# Patient Record
Sex: MALE | Race: WHITE | Employment: FULL TIME | URBAN - METROPOLITAN AREA
[De-identification: names, ages, dates, MRNs, and addresses within clinical notes are randomized per-mention and may not be internally consistent; named-entity substitution may affect disease eponyms.]

---

## 2022-08-09 ENCOUNTER — HOSPITAL ENCOUNTER (OUTPATIENT)
Age: 37
Discharge: HOME | End: 2022-08-09
Payer: COMMERCIAL

## 2022-08-09 VITALS
HEART RATE: 78 BPM | BODY MASS INDEX: 27.4 KG/M2 | RESPIRATION RATE: 18 BRPM | DIASTOLIC BLOOD PRESSURE: 95 MMHG | TEMPERATURE: 99 F | OXYGEN SATURATION: 100 % | WEIGHT: 225 LBS | HEIGHT: 76 IN | SYSTOLIC BLOOD PRESSURE: 144 MMHG

## 2022-08-09 DIAGNOSIS — U07.1 COVID-19: Primary | ICD-10-CM

## 2022-08-09 LAB — SARS-COV-2 RNA RESP QL NAA+PROBE: DETECTED

## 2022-08-09 PROCEDURE — 99203 OFFICE O/P NEW LOW 30 MIN: CPT | Performed by: NURSE PRACTITIONER

## 2022-08-09 PROCEDURE — U0002 COVID-19 LAB TEST NON-CDC: HCPCS | Performed by: NURSE PRACTITIONER

## 2022-08-09 NOTE — ED INITIAL ASSESSMENT (HPI)
Presents with complaints of cough, runny nose, sore throat since approx. Sunday night. Denies  home covid test. Denies feeling feverish. Reports being exposed to covid positive family member and family member with pneumonia.

## 2023-07-04 ENCOUNTER — HOSPITAL ENCOUNTER (OUTPATIENT)
Age: 38
Discharge: HOME | End: 2023-07-04
Payer: COMMERCIAL

## 2023-07-04 VITALS
RESPIRATION RATE: 16 BRPM | SYSTOLIC BLOOD PRESSURE: 135 MMHG | HEART RATE: 65 BPM | DIASTOLIC BLOOD PRESSURE: 85 MMHG | OXYGEN SATURATION: 100 % | TEMPERATURE: 98 F

## 2023-07-04 DIAGNOSIS — M62.830 BACK SPASM: Primary | ICD-10-CM

## 2023-07-04 PROCEDURE — 96372 THER/PROPH/DIAG INJ SC/IM: CPT | Performed by: NURSE PRACTITIONER

## 2023-07-04 PROCEDURE — 99213 OFFICE O/P EST LOW 20 MIN: CPT | Performed by: NURSE PRACTITIONER

## 2023-07-04 RX ORDER — METHYLPREDNISOLONE 4 MG/1
TABLET ORAL
Qty: 1 EACH | Refills: 0 | Status: SHIPPED | OUTPATIENT
Start: 2023-07-04

## 2023-07-04 RX ORDER — IBUPROFEN 600 MG/1
600 TABLET ORAL EVERY 6 HOURS PRN
Qty: 12 TABLET | Refills: 0 | Status: SHIPPED | OUTPATIENT
Start: 2023-07-04 | End: 2023-07-11

## 2023-07-04 RX ORDER — KETOROLAC TROMETHAMINE 30 MG/ML
60 INJECTION, SOLUTION INTRAMUSCULAR; INTRAVENOUS ONCE
Status: COMPLETED | OUTPATIENT
Start: 2023-07-04 | End: 2023-07-04

## 2023-07-04 RX ORDER — TIZANIDINE HYDROCHLORIDE 4 MG/1
4 CAPSULE, GELATIN COATED ORAL 3 TIMES DAILY PRN
Qty: 10 CAPSULE | Refills: 0 | Status: SHIPPED | OUTPATIENT
Start: 2023-07-04

## 2023-07-04 NOTE — DISCHARGE INSTRUCTIONS
Take ibuprofen every 6 hours as needed for pain. Use the muscle relaxer for the tightness. Do not take prior to driving or after drinking. Take the steroids daily to help with inflammation. You may use over the counter lidocaine patches. Apply heat. Gentle stretching. Recommend nonpharmacologic measures such as massage, acupuncture, TENS stimulation.   You may follow-up with the physiatrist

## 2023-10-18 ENCOUNTER — APPOINTMENT (OUTPATIENT)
Dept: OCCUPATIONAL MEDICINE | Age: 38
End: 2023-10-18
Attending: NURSE PRACTITIONER

## 2024-01-17 ENCOUNTER — HOSPITAL ENCOUNTER (OUTPATIENT)
Age: 39
Discharge: HOME | End: 2024-01-17
Payer: OTHER MISCELLANEOUS

## 2024-01-17 VITALS
OXYGEN SATURATION: 100 % | HEART RATE: 65 BPM | DIASTOLIC BLOOD PRESSURE: 87 MMHG | RESPIRATION RATE: 18 BRPM | SYSTOLIC BLOOD PRESSURE: 139 MMHG | TEMPERATURE: 98 F

## 2024-01-17 DIAGNOSIS — M54.6 ACUTE LEFT-SIDED THORACIC BACK PAIN: Primary | ICD-10-CM

## 2024-01-17 PROCEDURE — 99213 OFFICE O/P EST LOW 20 MIN: CPT | Performed by: PHYSICIAN ASSISTANT

## 2024-01-17 RX ORDER — LIDOCAINE 50 MG/G
1 PATCH TOPICAL EVERY 24 HOURS
Qty: 5 PATCH | Refills: 0 | Status: SHIPPED | OUTPATIENT
Start: 2024-01-17 | End: 2024-01-22

## 2024-01-17 RX ORDER — IBUPROFEN 600 MG/1
TABLET ORAL
Qty: 20 TABLET | Refills: 0 | Status: SHIPPED | OUTPATIENT
Start: 2024-01-17

## 2024-01-17 RX ORDER — CYCLOBENZAPRINE HCL 10 MG
10 TABLET ORAL 3 TIMES DAILY PRN
Qty: 14 TABLET | Refills: 0 | Status: SHIPPED | OUTPATIENT
Start: 2024-01-17 | End: 2024-01-24

## 2024-01-17 NOTE — ED PROVIDER NOTES
Patient Seen in: [facility]    History     Chief Complaint   Patient presents with    Back Pain     Stated Complaint: [facility]- Back Pain    HPI    Nahid Stein is a 38 year old male who presents with chief complaint of left thoracic back pain.  Onset yesterday.  Patient states that pain started while lifting weights at work.  Patient states pain has significantly improved this morning after taking over-the-counter ibuprofen.  Patient states that he would like to return to work tomorrow.  Patient denies fever, chills, abdominal pain, nausea, vomiting, diarrhea, constipation, dysuria, hematuria, saddle anesthesia, bowel/bladder incontinence, weakness, paresthesias, cough, wheeze, dyspnea, hemoptysis, extremity pain, extremity swelling.      No past medical history on file.    No past surgical history on file.         No family history on file.    Social History     Socioeconomic History    Marital status:    Tobacco Use    Smoking status: Never   Substance and Sexual Activity    Drug use: No   Social History Narrative    ** Merged History Encounter **            Review of Systems    Positive for stated complaint: [facility]- Back Pain  Other systems are as noted in HPI.  Constitutional and vital signs reviewed.      All other systems reviewed and negative except as noted above.    PSFH elements reviewed from today and agreed except as otherwise stated in HPI.    Physical Exam     ED Triage Vitals [01/17/24 0840]   /87   Pulse 65   Resp 18   Temp 97.8 °F (36.6 °C)   Temp src Temporal   SpO2 100 %   O2 Device None (Room air)       Current:/87   Pulse 65   Temp 97.8 °F (36.6 °C) (Temporal)   Resp 18   SpO2 100%     PULSE OX within normal limits on room air as interpreted by this provider.      Constitutional: The patient is cooperative. Appears well-developed and well-nourished.  Mild discomfort.  Psychological: Alert, No abnormalities of mood, affect.  Head: Normocephalic/atraumatic.  Eyes:  Pupils are equal round reactive to light.  Conjunctiva are within normal limits.  ENT: Oropharynx is clear.  Neck: The neck is supple.  No meningeal signs.  Chest: There is no tenderness to the chest wall.  Respiratory: Respiratory effort was normal.  There is no rales, wheezes, or rhonchi.  There is no stridor.  Air entry is equal.  Cardiovascular: Regular rate and rhythm.  Capillary refill is brisk.    Genitourinary: Not examined.  Lymphatic: No gross lymphadenopathy noted.  Musculoskeletal: Back - Normal to inspection.  Reproducible tenderness to palpation present at left thoracic paraspinal muscles. No CVA tenderness bilaterally.  No vertebral point tenderness.  Full range of motion with reported pain.  No palpable muscle spasm.  Remainder of musculoskeletal system is grossly intact.  There is no obvious deformity.  Neurological: Normal motor exam.  Normal gait.  Normal sensory exam.  Patient exhibits normal speech.  Skin: Skin is normal to inspection.  Warm and dry.  No obvious bruising.  No obvious rash.      ED Course   Labs Reviewed - No data to display    MDM     Patient declined thoracic spine x-rays.    Physical exam remained stable as previously documented.  Physical exam findings discussed with patient.    I have given the patient instructions regarding their diagnoses, expectations, follow up, and ER precautions. I explained to the patient that emergent conditions may arise and to go to the ER for new, worsening or any persistent conditions. I've explained the importance of following up with their doctor as instructed. The patient verbalized understanding of the discharge instructions and plan.      Disposition and Plan     Clinical Impression:  1. Acute left-sided thoracic back pain        Disposition:  Discharge    Follow-up:  [facility] in [address] [address]  [phone]  Call in 1 day  For follow-up      Medications Prescribed:  Discharge Medication  List as of 1/17/2024  8:51 AM        START taking these medications    Details   ibuprofen 600 MG Oral Tab Take 1 tablet (600 mg total) by mouth every 6 hours with food, Normal, Disp-20 tablet, R-0      lidocaine 5 % External Patch Place 1 patch onto the skin daily for 5 days., Normal, Disp-5 patch, R-0      cyclobenzaprine 10 MG Oral Tab Take 1 tablet (10 mg total) by mouth 3 (three) times daily as needed for Muscle spasms., Normal, Disp-14 tablet, R-0

## 2024-11-14 ENCOUNTER — OFFICE VISIT (OUTPATIENT)
Dept: OCCUPATIONAL MEDICINE | Age: 39
End: 2024-11-14
Attending: NURSE PRACTITIONER

## 2024-11-14 DIAGNOSIS — Z00.00 ROUTINE GENERAL MEDICAL EXAMINATION AT A HEALTH CARE FACILITY: Primary | ICD-10-CM

## 2025-06-21 ENCOUNTER — HOSPITAL ENCOUNTER (OUTPATIENT)
Age: 40
Discharge: HOME | End: 2025-06-21
Payer: COMMERCIAL

## 2025-06-21 VITALS
DIASTOLIC BLOOD PRESSURE: 81 MMHG | TEMPERATURE: 98 F | SYSTOLIC BLOOD PRESSURE: 133 MMHG | RESPIRATION RATE: 18 BRPM | HEART RATE: 98 BPM | OXYGEN SATURATION: 100 %

## 2025-06-21 DIAGNOSIS — J02.9 PHARYNGITIS, UNSPECIFIED ETIOLOGY: Primary | ICD-10-CM

## 2025-06-21 LAB
S PYO AG THROAT QL: NEGATIVE
SARS-COV-2 RNA RESP QL NAA+PROBE: NOT DETECTED

## 2025-06-21 PROCEDURE — 87880 STREP A ASSAY W/OPTIC: CPT | Performed by: PHYSICIAN ASSISTANT

## 2025-06-21 PROCEDURE — 99213 OFFICE O/P EST LOW 20 MIN: CPT | Performed by: PHYSICIAN ASSISTANT

## 2025-06-21 PROCEDURE — U0002 COVID-19 LAB TEST NON-CDC: HCPCS | Performed by: PHYSICIAN ASSISTANT

## 2025-06-21 RX ORDER — AMOXICILLIN 500 MG/1
500 TABLET, FILM COATED ORAL 3 TIMES DAILY
Qty: 30 TABLET | Refills: 0 | Status: SHIPPED | OUTPATIENT
Start: 2025-06-21 | End: 2025-07-01

## 2025-06-21 RX ORDER — BENZOCAINE AND MENTHOL, UNSPECIFIED FORM 15; 2.3 MG/1; MG/1
1 LOZENGE ORAL 3 TIMES DAILY PRN
Qty: 16 LOZENGE | Refills: 0 | Status: SHIPPED | OUTPATIENT
Start: 2025-06-21

## 2025-06-21 NOTE — ED PROVIDER NOTES
Chief Complaint   Patient presents with    Sore Throat       HPI:     Nahid Stein is a 39 year old male who presents for evaluation of sore throat over the last few days, notes low-grade temperature with ibuprofen Tylenol periodically for temperature as well as pain, pain to the throat currently is a 4 out of 10, denies any COVID exposure recent illness or antibiotic.  Denies associated headache earache nasal congestion dysphagia neck pain chest pain productive cough abdominal pain shortness of breath vomiting or diarrhea.      PFSH    PFSH asessment screens reviewed and agree.  Nurses notes reviewed I agree with documentation.    Family History[1]  Family history reviewed with patient/caregiver and is not pertinent to presenting problem.  Social History     Socioeconomic History    Marital status:      Spouse name: Not on file    Number of children: Not on file    Years of education: Not on file    Highest education level: Not on file   Occupational History    Not on file   Tobacco Use    Smoking status: Never    Smokeless tobacco: Not on file   Vaping Use    Vaping status: Never Used   Substance and Sexual Activity    Alcohol use: Never    Drug use: No    Sexual activity: Not on file   Other Topics Concern    Not on file   Social History Narrative    ** Merged History Encounter **          Social Drivers of Health     Food Insecurity: Low Risk  (3/4/2025)    Received from [facility]    Food Insecurity     Have there been times that your food ran out, and you didn't have money to get more?: No     Are there times that you worry that this might happen?: No   Transportation Needs: Low Risk  (3/4/2025)    Received from [facility]    Transportation Needs     Do you have trouble getting transportation to medical appointments?: No     How do you normally get to and from your appointments?: Not on file   Housing Stability: Low Risk  (3/4/2025)    Received from  [facility]    Housing Stability     Are you worried that your electric, gas, oil, or water might be shut off?: No     Are you concerned about having a safe and reliable place to live?: No         ROS:   Positive for stated complaint: Sore throat.  All other systems reviewed and negative except as noted above.  Constitutional and Vital Signs Reviewed.      Physical Exam:     Findings:    /81   Pulse 98   Temp 98.2 °F (36.8 °C) (Oral)   Resp 18   SpO2 100%   GENERAL: well developed, well nourished, well hydrated, no distress  SKIN: good skin turgor, no obvious rashes  NECK: Mild anterior cervical lymphadenopathy bilaterally, no nuchal rigidity.  EXTREMITIES: no cyanosis or edema. AC without difficulty  GI: soft, non-tender, normal bowel sounds  HEAD: normocephalic, atraumatic  EYES: sclera non icteric bilateral, conjunctiva clear  EARS: TMs clear bilaterally. Canals clear.  NOSE: No rhinorrhea MMM.  Nasal turbinates: pink, normal mucosa  THROAT: Mild erythema posterior pharynx nonreactive tonsils.  No visualized PTA.  Without exudates, uvula midline, and airway patent  LUNGS: clear to auscultation bilaterally; no rales, rhonchi, or wheezes  NEURO: No focal deficits  PSYCH: Alert and oriented x3.  Answering questions appropriately.  Mood appropriate.    MDM/Assessment/Plan:   Orders for this encounter:    Orders Placed This Encounter    POCT Rapid Strep    Rapid SARS-CoV-2 by PCR     Release to patient:   Immediate    POCT Rapid Strep    Benzocaine-Menthol (CEPACOL) 15-2.3 MG Mouth/Throat Lozenge     Sig: Use as directed 1 lozenge in the mouth or throat 3 (three) times daily as needed.     Dispense:  16 lozenge     Refill:  0    amoxicillin 500 MG Oral Tab     Sig: Take 1 tablet (500 mg total) by mouth 3 (three) times daily for 10 days.     Dispense:  30 tablet     Refill:  0       Labs performed this visit:  Recent Results (from the past 10 hours)   POCT Rapid Strep    Collection Time:  06/21/25 11:16 AM   Result Value Ref Range    POCT Rapid Strep Negative Negative   Rapid SARS-CoV-2 by PCR    Collection Time: 06/21/25 11:18 AM    Specimen: Nares; Other   Result Value Ref Range    Rapid SARS-CoV-2 by PCR Not Detected Not Detected       MDM:  Strep negative, coronavirus second negative.  Discussed with patient based on evaluation and symptoms likely alternative viral etiology with patient requesting empiric coverage for strep based on discussion and evaluation, will discard toothbrush and readdress outpatient for worsening lingering issues, happy with plan of care nontoxic-appearing.    Diagnosis:    ICD-10-CM    1. Pharyngitis, unspecified etiology  J02.9 Rapid SARS-CoV-2 by PCR     Rapid SARS-CoV-2 by PCR          All results reviewed and discussed with patient.  See AVS for detailed discharge instructions for your condition today.    Follow Up with:  Elias Sorensen APRN  [address] [place] [id number]  [phone]    Schedule an appointment as soon as possible for a visit in 3 days  As needed, If symptoms worsen PRIMARY CARE REFERRAL         [1] No family history on file.

## (undated) NOTE — LETTER
Date & Time: 1/17/2024, 8:51 AM  Patient: Nahid Stein  Encounter Provider(s):    Ramona Garcia PA       To Whom It May Concern:    Nahid Stein was seen and treated in our department on 1/17/2024. He may return to work on 1/18/2024.    If you have any questions or concerns, please do not hesitate to call.        _____________________________  Physician/APC Signature